# Patient Record
Sex: FEMALE | Race: WHITE | NOT HISPANIC OR LATINO | URBAN - METROPOLITAN AREA
[De-identification: names, ages, dates, MRNs, and addresses within clinical notes are randomized per-mention and may not be internally consistent; named-entity substitution may affect disease eponyms.]

---

## 2020-09-03 ENCOUNTER — AMBULATORY SURGICAL CENTER (OUTPATIENT)
Dept: URBAN - METROPOLITAN AREA SURGERY 20 | Facility: SURGERY | Age: 60
End: 2020-09-03
Payer: MEDICARE

## 2020-09-03 ENCOUNTER — OFFICE (OUTPATIENT)
Dept: URBAN - METROPOLITAN AREA PATHOLOGY 4 | Facility: PATHOLOGY | Age: 60
End: 2020-09-03
Payer: MEDICARE

## 2020-09-03 DIAGNOSIS — K63.89 OTHER SPECIFIED DISEASES OF INTESTINE: ICD-10-CM

## 2020-09-03 DIAGNOSIS — K50.90 CROHN'S DISEASE, UNSPECIFIED, WITHOUT COMPLICATIONS: ICD-10-CM

## 2020-09-03 DIAGNOSIS — K50.80 CROHN'S DISEASE OF BOTH SMALL AND LARGE INTESTINE WITHOUT CO: ICD-10-CM

## 2020-09-03 LAB
GI HISTOLOGY: A. SELECT: (no result)
GI HISTOLOGY: B. SELECT: (no result)
GI HISTOLOGY: C. SELECT: (no result)
GI HISTOLOGY: PDF REPORT: (no result)

## 2020-09-03 PROCEDURE — 45378 DIAGNOSTIC COLONOSCOPY: CPT | Mod: PT | Performed by: INTERNAL MEDICINE

## 2020-09-03 PROCEDURE — 45380 COLONOSCOPY AND BIOPSY: CPT | Mod: PT | Performed by: INTERNAL MEDICINE

## 2020-09-03 PROCEDURE — 88305 TISSUE EXAM BY PATHOLOGIST: CPT | Performed by: INTERNAL MEDICINE

## 2021-09-09 PROBLEM — K50.90 CROHN'S DISEASE, UNSPECIFIED, WITHOUT COMPLICATIONS: Status: ACTIVE | Noted: 2020-09-03

## 2023-06-30 ENCOUNTER — ON CAMPUS - OUTPATIENT (OUTPATIENT)
Dept: URBAN - METROPOLITAN AREA HOSPITAL 114 | Facility: HOSPITAL | Age: 63
End: 2023-06-30
Payer: MEDICARE

## 2023-06-30 DIAGNOSIS — K29.50 UNSPECIFIED CHRONIC GASTRITIS WITHOUT BLEEDING: ICD-10-CM

## 2023-06-30 DIAGNOSIS — K44.9 DIAPHRAGMATIC HERNIA WITHOUT OBSTRUCTION OR GANGRENE: ICD-10-CM

## 2023-06-30 DIAGNOSIS — K22.2 ESOPHAGEAL OBSTRUCTION: ICD-10-CM

## 2023-06-30 DIAGNOSIS — K21.01 GASTRO-ESOPHAGEAL REFLUX DISEASE WITH ESOPHAGITIS, WITH BLEE: ICD-10-CM

## 2023-06-30 DIAGNOSIS — R13.10 DYSPHAGIA, UNSPECIFIED: ICD-10-CM

## 2023-06-30 PROCEDURE — 43239 EGD BIOPSY SINGLE/MULTIPLE: CPT | Performed by: INTERNAL MEDICINE

## 2023-06-30 PROCEDURE — 43450 DILATE ESOPHAGUS 1/MULT PASS: CPT | Performed by: INTERNAL MEDICINE

## 2024-02-01 ENCOUNTER — TRANSCRIBE ORDERS (OUTPATIENT)
Dept: ADMINISTRATIVE | Facility: HOSPITAL | Age: 64
End: 2024-02-01
Payer: MEDICARE

## 2024-02-01 DIAGNOSIS — Z12.31 VISIT FOR SCREENING MAMMOGRAM: Primary | ICD-10-CM

## 2024-02-22 ENCOUNTER — OFFICE VISIT (OUTPATIENT)
Dept: INTERNAL MEDICINE | Facility: CLINIC | Age: 64
End: 2024-02-22
Payer: MEDICARE

## 2024-02-22 ENCOUNTER — HOSPITAL ENCOUNTER (OUTPATIENT)
Dept: MAMMOGRAPHY | Facility: HOSPITAL | Age: 64
Discharge: HOME | End: 2024-02-22
Admitting: FAMILY MEDICINE
Payer: MEDICARE

## 2024-02-22 VITALS
TEMPERATURE: 98 F | BODY MASS INDEX: 26.93 KG/M2 | OXYGEN SATURATION: 97 % | HEIGHT: 63 IN | DIASTOLIC BLOOD PRESSURE: 82 MMHG | HEART RATE: 92 BPM | SYSTOLIC BLOOD PRESSURE: 126 MMHG | WEIGHT: 152 LBS

## 2024-02-22 DIAGNOSIS — M54.9 UPPER BACK PAIN: ICD-10-CM

## 2024-02-22 DIAGNOSIS — N64.4 PAINFUL BREASTS: ICD-10-CM

## 2024-02-22 DIAGNOSIS — N62 LARGE BREASTS: ICD-10-CM

## 2024-02-22 DIAGNOSIS — M67.431 GANGLION CYST OF WRIST, RIGHT: Primary | ICD-10-CM

## 2024-02-22 DIAGNOSIS — Z12.31 VISIT FOR SCREENING MAMMOGRAM: ICD-10-CM

## 2024-02-22 PROBLEM — M67.40 GANGLION CYST: Status: ACTIVE | Noted: 2024-02-22

## 2024-02-22 PROCEDURE — 77063 BREAST TOMOSYNTHESIS BI: CPT

## 2024-02-22 PROCEDURE — 99213 OFFICE O/P EST LOW 20 MIN: CPT | Performed by: FAMILY MEDICINE

## 2024-02-22 PROCEDURE — 1160F RVW MEDS BY RX/DR IN RCRD: CPT | Performed by: FAMILY MEDICINE

## 2024-02-22 PROCEDURE — 77067 SCR MAMMO BI INCL CAD: CPT

## 2024-02-22 PROCEDURE — 1159F MED LIST DOCD IN RCRD: CPT | Performed by: FAMILY MEDICINE

## 2024-02-22 RX ORDER — CLOBETASOL PROPIONATE 0.5 MG/G
OINTMENT TOPICAL
COMMUNITY
Start: 2023-12-06

## 2024-02-22 NOTE — PROGRESS NOTES
Subjective   Salome HINOJOSA is a 63 y.o. female presenting today for follow up of   Chief Complaint   Patient presents with    Wrist Pain     Growth on right wrist, painful, has had for 2-3 months but notes that is got big quickly    Breast Pain     Bilateral breast pain, very tender. Would like to discuss reduction       History of Present Illness     Mass of right wrist.  She has a history of ganglion cyst removal of the wrist wrist.  Over the past 3 months she has noticed another mass of the volar wrist that is growing.  It is tender.  Large breasts.  Pt reports that are painful and cause upper back pain.  She is interested in breast reduction.    Patient Active Problem List   Diagnosis    History of breast lump    Myasthenia gravis    Persistent insomnia    History of colon polyps    Crohn's disease    Menopausal symptom    Bipolar disorder    Attention deficit hyperactivity disorder (ADHD), combined type    Hypovitaminosis D    Physical exam, annual    Injury of finger of right hand    Labral tear of right hip joint    Chronic right shoulder pain    Senile cataracts of both eyes    Eyebrow ptosis    Acquired blepharoptosis of both eyes    Osteopenia of hip    Benign microscopic hematuria    Primary osteoarthritis of right hip    Elevated MCV    Neck pain    Cervical spondylosis without myelopathy    Ganglion cyst       Current Outpatient Medications on File Prior to Visit   Medication Sig    amphetamine-dextroamphetamine XR (ADDERALL XR) 20 MG 24 hr capsule Take 1 capsule by mouth Every Morning HOLD PRIOR TO SURG 3 TO 5 DAYS    ARIPiprazole (ABILIFY) 5 MG tablet Take 1 tablet by mouth Daily.    azaTHIOprine (IMURAN) 50 MG tablet Take 2 tablets by mouth Daily.    clobetasol (TEMOVATE) 0.05 % ointment APPLY TO AFFECTED AREA TWICE DAILY FOR UP TO TWO WEEKS    cyclobenzaprine (FLEXERIL) 5 MG tablet TAKE 1 TABLET BY MOUTH THREE TIMES A DAY AS NEEDED FOR MUSCLE SPASM    diclofenac (VOLTAREN) 75 MG EC tablet TAKE 1  "TABLET BY MOUTH TWICE A DAY AS NEEDED FOR PAIN    estradiol (ESTRACE) 2 MG tablet Take 1 tablet by mouth Daily. (Patient taking differently: Take 0.5 tablets by mouth Daily.)    lamoTRIgine (LaMICtal) 200 MG tablet Take 1 tablet by mouth Every Morning.    lansoprazole (PREVACID) 30 MG capsule Take 1 capsule by mouth Daily.    MILK THISTLE PO Take  by mouth.    Mirabegron ER (MYRBETRIQ) 50 MG tablet sustained-release 24 hour 24 hr tablet Take 50 mg by mouth Daily.    zolpidem (AMBIEN) 10 MG tablet Take 1 tablet by mouth Every Night.     Current Facility-Administered Medications on File Prior to Visit   Medication    Chlorhexidine Gluconate Cloth 2 % pads          The following portions of the patient's history were reviewed and updated as appropriate: allergies, current medications, past family history, past medical history, past social history, past surgical history and problem list.    Review of Systems   Constitutional: Negative.    Respiratory: Negative.     Genitourinary:  Positive for breast pain.   Musculoskeletal:  Positive for arthralgias, back pain and neck pain.   Neurological:  Negative for numbness.       Objective   Vitals:    02/22/24 1451   BP: 126/82   BP Location: Left arm   Patient Position: Sitting   Cuff Size: Adult   Pulse: 92   Temp: 98 °F (36.7 °C)   TempSrc: Infrared   SpO2: 97%   Weight: 68.9 kg (152 lb)   Height: 160 cm (62.99\")       BP Readings from Last 3 Encounters:   02/22/24 126/82   08/08/23 136/78   07/31/23 138/76        Wt Readings from Last 3 Encounters:   02/22/24 68.9 kg (152 lb)   09/12/23 68.6 kg (151 lb 3.2 oz)   08/08/23 68.9 kg (152 lb)        Body mass index is 26.93 kg/m².  Nursing notes and vitals reviewed.    Physical Exam  Vitals and nursing note reviewed.   Constitutional:       General: She is not in acute distress.     Appearance: Normal appearance.   HENT:      Head: Normocephalic and atraumatic.      Mouth/Throat:      Mouth: Mucous membranes are moist.   Eyes: "      Extraocular Movements: Extraocular movements intact.      Conjunctiva/sclera: Conjunctivae normal.   Pulmonary:      Effort: Pulmonary effort is normal. No respiratory distress.   Musculoskeletal:      Left wrist: Normal.        Arms:       Cervical back: Neck supple. No rigidity.      Comments: 1 cm fluctuant mass volar wrist, right.   Skin:     General: Skin is warm and dry.   Neurological:      General: No focal deficit present.      Mental Status: She is alert and oriented to person, place, and time.   Psychiatric:         Mood and Affect: Mood normal.         Behavior: Behavior normal.         No results found for this or any previous visit (from the past 672 hour(s)).      Assessment & Plan   Diagnoses and all orders for this visit:    1. Ganglion cyst of wrist, right (Primary)  -     Ambulatory Referral to Hand Surgery    2. Large breasts  -     Ambulatory Referral to Plastic Surgery    3. Painful breasts  -     Ambulatory Referral to Plastic Surgery    4. Upper back pain  -     Ambulatory Referral to Plastic Surgery      Ganglion cyst.  Pt interested in removal.  Referred to hand surgery.  Large breasts causing discomfort.  Pt referred to plastic surgery.        Medications, including side effects, were discussed with the patient. Patient verbalized understanding.  The plan of care was discussed. All questions were answered. Patient verbalized understanding.      Return for Medicare Wellness.

## 2024-03-18 DIAGNOSIS — M54.12 CERVICAL RADICULAR PAIN: ICD-10-CM

## 2024-03-19 RX ORDER — CYCLOBENZAPRINE HCL 5 MG
5 TABLET ORAL 3 TIMES DAILY PRN
Qty: 90 TABLET | Refills: 1 | Status: SHIPPED | OUTPATIENT
Start: 2024-03-19

## 2024-03-19 NOTE — TELEPHONE ENCOUNTER
Rx Refill Note  Requested Prescriptions     Pending Prescriptions Disp Refills    cyclobenzaprine (FLEXERIL) 5 MG tablet [Pharmacy Med Name: CYCLOBENZAPRINE 5 MG TABLET] 90 tablet 1     Sig: TAKE 1 TABLET BY MOUTH THREE TIMES A DAY AS NEEDED FOR MUSCLE SPASM      Last office visit with prescribing clinician: 2/22/2024   Last telemedicine visit with prescribing clinician: Visit date not found   Next office visit with prescribing clinician: Visit date not found                         Would you like a call back once the refill request has been completed: [] Yes [] No    If the office needs to give you a call back, can they leave a voicemail: [] Yes [] No    Akilah Marcial, MARCY  03/19/24, 12:53 EDT

## 2024-03-27 ENCOUNTER — TELEPHONE (OUTPATIENT)
Dept: NEUROSURGERY | Facility: CLINIC | Age: 64
End: 2024-03-27
Payer: MEDICARE

## 2024-03-27 NOTE — TELEPHONE ENCOUNTER
Dr Dax Paredes's office, patients neurologists called for last office note. Faxed over only record we have to [phone]

## 2024-04-02 NOTE — TELEPHONE ENCOUNTER
Rx Refill Note  Requested Prescriptions     Pending Prescriptions Disp Refills    estradiol (ESTRACE) 2 MG tablet [Pharmacy Med Name: ESTRADIOL 2 MG TABLET] 90 tablet 3     Sig: TAKE 1 TABLET BY MOUTH EVERY DAY      Last office visit with prescribing clinician: 2/22/2024   Last telemedicine visit with prescribing clinician: Visit date not found   Next office visit with prescribing clinician: Visit date not found                         Would you like a call back once the refill request has been completed: [] Yes [] No    If the office needs to give you a call back, can they leave a voicemail: [] Yes [] No    Loraine Jim MA  04/02/24, 09:35 EDT

## 2024-04-03 RX ORDER — ESTRADIOL 2 MG/1
2 TABLET ORAL DAILY
Qty: 90 TABLET | Refills: 3 | Status: SHIPPED | OUTPATIENT
Start: 2024-04-03

## 2024-04-04 DIAGNOSIS — M54.12 CERVICAL RADICULAR PAIN: ICD-10-CM

## 2024-04-04 RX ORDER — DICLOFENAC SODIUM 75 MG/1
TABLET, DELAYED RELEASE ORAL
Qty: 60 TABLET | Refills: 1 | Status: SHIPPED | OUTPATIENT
Start: 2024-04-04

## 2024-05-29 DIAGNOSIS — M54.12 CERVICAL RADICULAR PAIN: ICD-10-CM

## 2024-05-29 RX ORDER — CYCLOBENZAPRINE HCL 5 MG
5 TABLET ORAL 3 TIMES DAILY PRN
Qty: 90 TABLET | Refills: 1 | Status: SHIPPED | OUTPATIENT
Start: 2024-05-29

## 2024-07-30 DIAGNOSIS — M54.12 CERVICAL RADICULAR PAIN: ICD-10-CM

## 2024-07-30 NOTE — TELEPHONE ENCOUNTER
Rx Refill Note  Requested Prescriptions     Pending Prescriptions Disp Refills    cyclobenzaprine (FLEXERIL) 5 MG tablet [Pharmacy Med Name: CYCLOBENZAPRINE 5 MG TABLET] 90 tablet 1     Sig: TAKE 1 TABLET BY MOUTH THREE TIMES A DAY AS NEEDED FOR MUSCLE SPASM      Last office visit with prescribing clinician: 2/22/2024   Last telemedicine visit with prescribing clinician: Visit date not found   Next office visit with prescribing clinician: Visit date not found                         Would you like a call back once the refill request has been completed: [] Yes [] No    If the office needs to give you a call back, can they leave a voicemail: [] Yes [] No    Katt Rodriguez MA  07/30/24, 19:08 EDT

## 2024-07-31 RX ORDER — CYCLOBENZAPRINE HCL 5 MG
5 TABLET ORAL 3 TIMES DAILY PRN
Qty: 90 TABLET | Refills: 1 | Status: SHIPPED | OUTPATIENT
Start: 2024-07-31

## 2024-09-19 DIAGNOSIS — M54.12 CERVICAL RADICULAR PAIN: ICD-10-CM

## 2024-09-19 RX ORDER — CYCLOBENZAPRINE HCL 5 MG
5 TABLET ORAL 3 TIMES DAILY PRN
Qty: 90 TABLET | Refills: 1 | Status: SHIPPED | OUTPATIENT
Start: 2024-09-19

## 2024-12-06 NOTE — TELEPHONE ENCOUNTER
Rx Refill Note  Requested Prescriptions     Pending Prescriptions Disp Refills    cyclobenzaprine (FLEXERIL) 5 MG tablet [Pharmacy Med Name: CYCLOBENZAPRINE 5 MG TABLET] 90 tablet 1     Sig: TAKE 1 TABLET BY MOUTH THREE TIMES A DAY AS NEEDED FOR MUSCLE SPASM      Last office visit with prescribing clinician: 2/22/2024   Last telemedicine visit with prescribing clinician: Visit date not found   Next office visit with prescribing clinician: Visit date not found                         Would you like a call back once the refill request has been completed: [] Yes [] No    If the office needs to give you a call back, can they leave a voicemail: [] Yes [] No    Akilah Marcial, MARCY  05/29/24, 14:06 EDT  
Name band;

## 2025-01-10 ENCOUNTER — APPOINTMENT (OUTPATIENT)
Dept: GENERAL RADIOLOGY | Facility: HOSPITAL | Age: 65
End: 2025-01-10
Payer: MEDICARE

## 2025-01-10 ENCOUNTER — HOSPITAL ENCOUNTER (EMERGENCY)
Facility: HOSPITAL | Age: 65
Discharge: HOME | End: 2025-01-10
Attending: EMERGENCY MEDICINE
Payer: MEDICARE

## 2025-01-10 VITALS
TEMPERATURE: 96.9 F | OXYGEN SATURATION: 98 % | SYSTOLIC BLOOD PRESSURE: 130 MMHG | HEART RATE: 89 BPM | RESPIRATION RATE: 20 BRPM | DIASTOLIC BLOOD PRESSURE: 80 MMHG

## 2025-01-10 DIAGNOSIS — S83.411A SPRAIN OF MEDIAL COLLATERAL LIGAMENT OF RIGHT KNEE, INITIAL ENCOUNTER: Primary | ICD-10-CM

## 2025-01-10 DIAGNOSIS — S80.01XA CONTUSION OF RIGHT KNEE, INITIAL ENCOUNTER: ICD-10-CM

## 2025-01-10 PROCEDURE — 73562 X-RAY EXAM OF KNEE 3: CPT

## 2025-01-10 PROCEDURE — 99283 EMERGENCY DEPT VISIT LOW MDM: CPT

## 2025-01-10 RX ORDER — ACETAMINOPHEN 500 MG
1000 TABLET ORAL ONCE
Status: COMPLETED | OUTPATIENT
Start: 2025-01-10 | End: 2025-01-10

## 2025-01-10 RX ADMIN — ACETAMINOPHEN 1000 MG: 500 TABLET, FILM COATED ORAL at 12:13

## 2025-01-10 NOTE — ED PROVIDER NOTES
MD ATTESTATION NOTE     SHARED VISIT: This visit was performed by BOTH a physician and an APC. The substantive portion of the medical decision making was performed by this attesting physician who made or approved the management plan and takes responsibility for patient management. All studies in the APC note (if performed) were independently interpreted by me.  The BREANNA and I have discussed this patient's history, physical exam, and treatment plan. I have reviewed the documentation and personally had a face to face interaction with the patient. I affirm the documentation and agree with the treatment and plan. I provided a substantive portion of the care of the patient.  I personally performed the physical exam in its entirety, and below are my findings.      Brief HPI: Patient presents to the ED with an acute right knee injury.  She slipped on ice last night.  Her right leg twisted underneath her.  She has been able to ambulate since then but pain is worse with ambulation.  Denies other injuries or numbness/tingling in her right leg.    PHYSICAL EXAM    GENERAL: Awake, alert, oriented x 3.  No acute distress  HENT: nares patent  EYES: no scleral icterus  CV: Normal right DP and PT pulses  RESPIRATORY: normal effort  ABDOMEN: soft  MUSCULOSKELETAL: There is tenderness over the medial aspect of the right knee.  There is bruising over the inferior medial aspect of the right knee.  Remainder of the right lower extremity is nontender.  There is full range of motion of the right knee.  NEURO: Normal strength and light touch sensation in the right lower extremity  PSYCH:  calm, cooperative  SKIN: warm, dry    Vital signs and nursing notes reviewed.        Plan: X-rays are negative acute.  She will be placed in a knee sleeve and given a walker.  She will be referred to orthopedics for follow-up.    Right knee x-rays personally interpreted by me.  My personal interpretation is: No fracture.  No dislocation.  No  effusion.    ED Course as of 01/10/25 1147   Fri Adrian 10, 2025   1105 Plain films show no acute fracture.  Patient is able to bear weight but this does exacerbate her symptoms.  Order neoprene knee brace, walker.  Will have the patient to follow-up closely with her orthopedist for further evaluation.  Blood return to the ED should she have any further concerns.  Patient is to avoid NSAIDs due to an upcoming breast surgery.  Will treat with Tylenol and RICE therapy. [RC]      ED Course User Index  [RC] Hang Betancourt III, Ross Mckeon MD  01/10/25 1147

## 2025-01-10 NOTE — ED PROVIDER NOTES
EMERGENCY DEPARTMENT ENCOUNTER    Room Number:  21/21  Date seen:  1/10/2025  Time seen: 11:00 EST  PCP: Mayo Lackey MD  Historian: Patient and patient's spouse    Discussed/obtained information from independent historians: Also some history    HPI:  Chief complaint: Right knee pain  A complete HPI/ROS/PMH/PSH/SH/FH are unobtainable due to: Nothing  Context:Salome HINOJOSA is a 64 y.o. female who presents to the ED with c/o  acute acute right knee pain.  Patient reports she was attempting to a send some icy steps.  Her foot slipped causing her to fall on the right leg injuring the right knee.  During the fall the leg was said to be flexed at the knee and displaced posterior laterally.  Patient denies any head.  No LOC.  She states she is able to bear weight but this does exacerbate her symptoms.  Pain does not radiate.  The pain is said to be primarily along the medial aspect of the right knee.  Patient is on anticoagulant or platelet therapy.  She is here for further evaluation.    External (non-ED) record review: Patient appears to be a patient of [facility].  I can see where she saw Dr. Weston Vann on 9/1/2022.  Patient had a total hip with arthroplasty performed by Dr. Vann on 9/14/2022    Chronic or social conditions impacting care:    ALLERGIES  Penicillins    PAST MEDICAL HISTORY  Active Ambulatory Problems     Diagnosis Date Noted    History of breast lump 03/02/2016    Myasthenia gravis 03/02/2016    Persistent insomnia 03/02/2016    History of colon polyps 03/02/2016    Crohn's disease 03/02/2016    Menopausal symptom 03/02/2016    Bipolar disorder     Attention deficit hyperactivity disorder (ADHD), combined type 03/02/2016    Hypovitaminosis D 09/14/2016    Physical exam, annual 10/10/2016    Injury of finger of right hand 03/15/2017    Labral tear of right hip joint 07/25/2019    Chronic right shoulder pain 07/25/2019    Senile cataracts of both eyes 12/10/2014    Eyebrow  ptosis 12/10/2014    Acquired blepharoptosis of both eyes 12/10/2014    Osteopenia of hip 08/07/2019    Benign microscopic hematuria 08/31/2020    Primary osteoarthritis of right hip 06/01/2022    Elevated MCV 07/01/2022    Neck pain 08/08/2023    Cervical spondylosis without myelopathy 08/08/2023    Ganglion cyst 02/22/2024     Resolved Ambulatory Problems     Diagnosis Date Noted    No Resolved Ambulatory Problems     Past Medical History:   Diagnosis Date    ADHD (attention deficit hyperactivity disorder)     Allergic     Arthritis     Bipolar 2 disorder     Chronic diarrhea     Clinical diagnosis of COVID-19 07/06/2022    Crohn disease     Depression     Deviated septum     OAB (overactive bladder)     Right hip pain     Skin cancer     Stool incontinence        PAST SURGICAL HISTORY  Past Surgical History:   Procedure Laterality Date    BREAST EXCISIONAL BIOPSY Right 2002    benign    BREAST LUMPECTOMY Right 2002    COLONOSCOPY      ENDOSCOPY N/A 06/30/2023    Procedure: ESOPHAGOGASTRODUODENOSCOPY WITH DILATION with 52 and 54 akbar and cold bxs;  Surgeon: Rigoberto Page MD;  Location: [facility] ENDOSCOPY;  Service: Gastroenterology;  Laterality: N/A;  PRE: Dysphagia  POST: Esophageal Ring, Esophagitis, Gastritis    EYE SURGERY  2014    Lid lift left eye    FINGER SURGERY Left     INDEX TIP TRAUMA    FRONTALIS SUSPENSION Left     HYSTERECTOMY      JOINT REPLACEMENT  9/16/2022    NASAL SEPTUM SURGERY      TOTAL HIP ARTHROPLASTY Right 09/14/2022    Procedure: TOTAL HIP ARTHROPLASTY ANTERIOR WITH HANA TABLE;  Surgeon: Weston Vann MD;  [place]  [facility] [place] [facility] [place]  Service: [place]opedics;  Laterality: Right;       FAMILY HISTORY  Family History   Problem Relation Age of Onset    Hypertension Mother     Arthritis Mother     Diabetes Father     Heart disease Father     Hypertension Father     Stroke Father     Cancer Maternal Grandmother         colon    Stroke Maternal Grandfather     Breast cancer Neg Hx     Malig  Hyperthermia Neg Hx        SOCIAL HISTORY  Social History     Socioeconomic History    Marital status:    Tobacco Use    Smoking status: Former     Current packs/day: 0.00     Average packs/day: 1 pack/day for 5.0 years (5.0 ttl pk-yrs)     Types: Cigarettes     Start date: 2002     Quit date: 2007     Years since quittin.0    Smokeless tobacco: Never   Vaping Use    Vaping status: Never Used   Substance and Sexual Activity    Alcohol use: Yes     Alcohol/week: 7.0 standard drinks of alcohol     Types: 7 Drinks containing 0.5 oz of alcohol per week     Comment: 1 DRINK DAY    Drug use: No    Sexual activity: Not Currently     Partners: Male     Birth control/protection: Hysterectomy       REVIEW OF SYSTEMS  Review of Systems    All systems reviewed and negative except for those discussed in HPI.     PHYSICAL EXAM    I have reviewed the triage vital signs and nursing notes.  Vitals:    01/10/25 1102   BP: 130/80   Pulse:    Resp:    Temp:    SpO2:      Physical Exam    GENERAL: DW female, not distressed  HENT: nares patent  EYES: no scleral icterus  NECK: no ROM limitations  CV: regular rhythm, regular rate S1-S2  RESPIRATORY: normal effort, lungs CTAB  ABDOMEN: soft  : deferred  MUSCULOSKELETAL: Right knee: TTP along the medial aspect of the knee vicinity MCL.  No laxity on exam.  There is some mild soft tissue swelling and ecchymosis.  The remainder of the right lower extremity to include the hip is unremarkable.  NEURO: alert, moves all extremities, follows commands  SKIN: warm, dry    LAB RESULTS  No results found for this or any previous visit (from the past 24 hours).    Ordered the above labs and independently interpreted results.  My findings will be discussed in the ED course or medical decision making section below    RADIOLOGY RESULTS  XR Knee 3 View Right    Result Date: 1/10/2025  XR KNEE 3 VW RIGHT-  STANDARD 2 VIEWS RIGHT KNEE  CLINICAL INFORMATION: Fell, pain  FINDINGS: Joint  width is preserved. No articular abnormality. No joint effusion, surrounding soft tissues within normal limits. No fracture nor dislocation. No bone lesion.  CONCLUSION: Normal right knee.  This report was finalized on 1/10/2025 10:32 AM by Dr. Earl Galvan M.D on Workstation: [employer]        Ordered the above noted radiological studies.  Independently interpreted by me.  My findings will be discussed in the medical decision section below.     PROGRESS, DATA ANALYSIS, CONSULTS AND MEDICAL DECISION MAKING    Please note that this section constitutes my independent interpretation of clinical data including lab results, radiology, EKG's.  This constitutes my independent professional opinion regarding differential diagnosis and management of this patient.  It may include any factors such as history from outside sources, review of external records, social determinants of health, management of medications, response to those treatments, and discussions with other providers.    ED Course as of 01/10/25 1935   Fri Adrian 10, 2025   1105 Plain films show no acute fracture.  Patient is able to bear weight but this does exacerbate her symptoms.  Order neoprene knee brace, walker.  Will have the patient to follow-up closely with her orthopedist for further evaluation.  Blood return to the ED should she have any further concerns.  Patient is to avoid NSAIDs due to an upcoming breast surgery.  Will treat with Tylenol and RICE therapy. [RC]      ED Course User Index  [RC] Hang Betancourt III, PA     Orders placed during this visit:  Orders Placed This Encounter   Procedures    [facility] 04.  Hinged Knee Brace, 12.  Standard Walker Folding; Prevents Accomplishing MRADLs; Able to Safely Use Equipment; Mobility Deficit Can Be Sufficiently Resolved By Use of Equipment    XR Knee 3 View Right            Medical Decision Making    DDx: Fracture, avulsion fracture, MCL strain, medial meniscal injury, other soft  tissue derangement of the right knee.  Course for MDM process.      DIAGNOSIS  Final diagnoses:   Sprain of medial collateral ligament of right knee, initial encounter   Contusion of right knee, initial encounter          Medication List      No changes were made to your prescriptions during this visit.         FOLLOW-UP  [facility]  [address]  [facility] [address] [phone]  Schedule an appointment as soon as possible for a visit   For further evaluation and treatment        Latest Documented Vital Signs:  As of 19:35 EST  BP- 130/80 HR- 89 Temp- 96.9 °F (36.1 °C) (Tympanic) O2 sat- 98%    Appropriate PPE utilized throughout this patient encounter to include mask, if indicated, per current protocol. Hand hygiene was performed before donning PPE and after removal when leaving the room.    Please note that portions of this were completed with a voice recognition program.     Note Disclaimer: At [facility], we believe that sharing information builds trust and better relationships. You are receiving this note because you are receiving care at [facility] or recently visited. It is possible you will see health information before a provider has talked with you about it. This kind of information can be easy to misunderstand. To help you fully understand what it means for your health, we urge you to discuss this note with your provider.                Hang Betancourt III, PA  01/10/25 1935

## 2025-01-10 NOTE — DISCHARGE INSTRUCTIONS
Wear the knee brace for the next week as needed.  Use a walker as needed for extra balance and to help prevent another fall secondary to a painful weak right lower extremity.  Recommend Tylenol and ice for pain.  Follow-up with your orthopedist as scheduled.  Return to the ER with any worsening symptoms or should you have any further concerns.

## 2025-01-25 DIAGNOSIS — M54.12 CERVICAL RADICULAR PAIN: ICD-10-CM

## 2025-01-27 DIAGNOSIS — Z86.0100 HISTORY OF COLON POLYPS: ICD-10-CM

## 2025-01-27 DIAGNOSIS — Z13.29 SCREENING FOR THYROID DISORDER: ICD-10-CM

## 2025-01-27 DIAGNOSIS — Z13.220 SCREENING FOR HYPERLIPIDEMIA: ICD-10-CM

## 2025-01-27 DIAGNOSIS — Z13.1 SCREENING FOR DIABETES MELLITUS: ICD-10-CM

## 2025-01-27 DIAGNOSIS — Z00.00 ROUTINE HEALTH MAINTENANCE: ICD-10-CM

## 2025-01-27 DIAGNOSIS — K50.919 CROHN'S DISEASE WITH COMPLICATION, UNSPECIFIED GASTROINTESTINAL TRACT LOCATION: ICD-10-CM

## 2025-01-27 DIAGNOSIS — E55.9 HYPOVITAMINOSIS D: Primary | ICD-10-CM

## 2025-01-27 RX ORDER — CYCLOBENZAPRINE HCL 5 MG
5 TABLET ORAL 3 TIMES DAILY PRN
Qty: 90 TABLET | Refills: 0 | Status: SHIPPED | OUTPATIENT
Start: 2025-01-27

## 2025-01-27 NOTE — TELEPHONE ENCOUNTER
Message sent to pt that appt is needed.   Rx Refill Note  Requested Prescriptions     Pending Prescriptions Disp Refills    cyclobenzaprine (FLEXERIL) 5 MG tablet [Pharmacy Med Name: CYCLOBENZAPRINE 5 MG TABLET] 90 tablet 0     Sig: TAKE 1 TABLET BY MOUTH THREE TIMES A DAY AS NEEDED FOR MUSCLE SPASM      Last office visit with prescribing clinician: 2/22/2024   Last telemedicine visit with prescribing clinician: Visit date not found   Next office visit with prescribing clinician: Visit date not found                         Would you like a call back once the refill request has been completed: [] Yes [] No    If the office needs to give you a call back, can they leave a voicemail: [] Yes [] No    Adrianne Lira MA  01/27/25, 10:16 EST

## 2025-01-28 DIAGNOSIS — E55.9 HYPOVITAMINOSIS D: ICD-10-CM

## 2025-01-28 DIAGNOSIS — Z86.0100 HISTORY OF COLON POLYPS: ICD-10-CM

## 2025-01-28 DIAGNOSIS — Z00.00 ROUTINE HEALTH MAINTENANCE: ICD-10-CM

## 2025-01-28 DIAGNOSIS — Z13.220 SCREENING FOR HYPERLIPIDEMIA: ICD-10-CM

## 2025-01-28 DIAGNOSIS — Z13.1 SCREENING FOR DIABETES MELLITUS: ICD-10-CM

## 2025-01-28 DIAGNOSIS — Z13.29 SCREENING FOR THYROID DISORDER: ICD-10-CM

## 2025-01-28 DIAGNOSIS — K50.919 CROHN'S DISEASE WITH COMPLICATION, UNSPECIFIED GASTROINTESTINAL TRACT LOCATION: ICD-10-CM

## 2025-01-30 LAB
25(OH)D3+25(OH)D2 SERPL-MCNC: 44.2 NG/ML (ref 30–100)
ALBUMIN SERPL-MCNC: 4.3 G/DL (ref 3.9–4.9)
ALP SERPL-CCNC: 63 IU/L (ref 44–121)
ALT SERPL-CCNC: 11 IU/L (ref 0–32)
AST SERPL-CCNC: 20 IU/L (ref 0–40)
BILIRUB SERPL-MCNC: 0.2 MG/DL (ref 0–1.2)
BUN SERPL-MCNC: 13 MG/DL (ref 8–27)
BUN/CREAT SERPL: 21 (ref 12–28)
CALCIUM SERPL-MCNC: 9.3 MG/DL (ref 8.7–10.3)
CHLORIDE SERPL-SCNC: 100 MMOL/L (ref 96–106)
CHOLEST SERPL-MCNC: 175 MG/DL (ref 100–199)
CHOLEST/HDLC SERPL: 2.6 RATIO (ref 0–4.4)
CO2 SERPL-SCNC: 26 MMOL/L (ref 20–29)
CREAT SERPL-MCNC: 0.63 MG/DL (ref 0.57–1)
EGFRCR SERPLBLD CKD-EPI 2021: 98 ML/MIN/1.73
ERYTHROCYTE [DISTWIDTH] IN BLOOD BY AUTOMATED COUNT: 14.2 % (ref 11.7–15.4)
GLOBULIN SER CALC-MCNC: 2.2 G/DL (ref 1.5–4.5)
GLUCOSE SERPL-MCNC: 93 MG/DL (ref 70–99)
HBA1C MFR BLD: 5.5 % (ref 4.8–5.6)
HCT VFR BLD AUTO: 37.8 % (ref 34–46.6)
HDLC SERPL-MCNC: 67 MG/DL
HGB BLD-MCNC: 12.3 G/DL (ref 11.1–15.9)
LDLC SERPL CALC-MCNC: 71 MG/DL (ref 0–99)
MCH RBC QN AUTO: 33.2 PG (ref 26.6–33)
MCHC RBC AUTO-ENTMCNC: 32.5 G/DL (ref 31.5–35.7)
MCV RBC AUTO: 102 FL (ref 79–97)
PLATELET # BLD AUTO: 353 X10E3/UL (ref 150–450)
POTASSIUM SERPL-SCNC: 4.5 MMOL/L (ref 3.5–5.2)
PROT SERPL-MCNC: 6.5 G/DL (ref 6–8.5)
RBC # BLD AUTO: 3.71 X10E6/UL (ref 3.77–5.28)
SODIUM SERPL-SCNC: 139 MMOL/L (ref 134–144)
TRIGL SERPL-MCNC: 229 MG/DL (ref 0–149)
TSH SERPL DL<=0.005 MIU/L-ACNC: 2.33 UIU/ML (ref 0.45–4.5)
VIT B12 SERPL-MCNC: 592 PG/ML (ref 232–1245)
VLDLC SERPL CALC-MCNC: 37 MG/DL (ref 5–40)
WBC # BLD AUTO: 5.6 X10E3/UL (ref 3.4–10.8)

## 2025-03-01 DIAGNOSIS — M54.12 CERVICAL RADICULAR PAIN: ICD-10-CM

## 2025-03-03 RX ORDER — CYCLOBENZAPRINE HCL 5 MG
5 TABLET ORAL 3 TIMES DAILY PRN
Qty: 90 TABLET | Refills: 0 | Status: SHIPPED | OUTPATIENT
Start: 2025-03-03

## 2025-03-03 NOTE — TELEPHONE ENCOUNTER
Has appt in April    Rx Refill Note  Requested Prescriptions     Pending Prescriptions Disp Refills    cyclobenzaprine (FLEXERIL) 5 MG tablet [Pharmacy Med Name: CYCLOBENZAPRINE 5 MG TABLET] 90 tablet 0     Sig: TAKE 1 TABLET BY MOUTH THREE TIMES A DAY AS NEEDED FOR MUSCLE SPASM      Last office visit with prescribing clinician: 2/22/2024   Last telemedicine visit with prescribing clinician: Visit date not found   Next office visit with prescribing clinician: 4/23/2025                         Would you like a call back once the refill request has been completed: [] Yes [] No    If the office needs to give you a call back, can they leave a voicemail: [] Yes [] No    Adrianne Lira MA  03/03/25, 08:09 EST

## 2025-04-02 DIAGNOSIS — M54.12 CERVICAL RADICULAR PAIN: ICD-10-CM

## 2025-04-02 RX ORDER — CYCLOBENZAPRINE HCL 5 MG
5 TABLET ORAL 3 TIMES DAILY PRN
Qty: 90 TABLET | Refills: 0 | Status: SHIPPED | OUTPATIENT
Start: 2025-04-02

## 2025-04-02 NOTE — TELEPHONE ENCOUNTER
Rx Refill Note  Requested Prescriptions     Pending Prescriptions Disp Refills    cyclobenzaprine (FLEXERIL) 5 MG tablet [Pharmacy Med Name: CYCLOBENZAPRINE 5 MG TABLET] 90 tablet 0     Sig: TAKE 1 TABLET BY MOUTH THREE TIMES A DAY AS NEEDED FOR MUSCLE SPASM      Last office visit with prescribing clinician: 2/22/2024   Last telemedicine visit with prescribing clinician: Visit date not found   Next office visit with prescribing clinician: 4/23/2025                         Would you like a call back once the refill request has been completed: [] Yes [] No    If the office needs to give you a call back, can they leave a voicemail: [] Yes [] No    Adrianne Lira MA  04/02/25, 09:00 EDT

## 2025-04-23 ENCOUNTER — OFFICE VISIT (OUTPATIENT)
Dept: INTERNAL MEDICINE | Facility: CLINIC | Age: 65
End: 2025-04-23
Payer: MEDICARE

## 2025-04-23 VITALS
HEIGHT: 63 IN | HEART RATE: 88 BPM | WEIGHT: 145 LBS | SYSTOLIC BLOOD PRESSURE: 150 MMHG | BODY MASS INDEX: 25.69 KG/M2 | OXYGEN SATURATION: 96 % | DIASTOLIC BLOOD PRESSURE: 90 MMHG

## 2025-04-23 DIAGNOSIS — E55.9 HYPOVITAMINOSIS D: ICD-10-CM

## 2025-04-23 DIAGNOSIS — M85.859 OSTEOPENIA OF HIP, UNSPECIFIED LATERALITY: ICD-10-CM

## 2025-04-23 DIAGNOSIS — N95.1 MENOPAUSAL SYMPTOM: ICD-10-CM

## 2025-04-23 DIAGNOSIS — G70.00 MYASTHENIA GRAVIS: ICD-10-CM

## 2025-04-23 DIAGNOSIS — M54.2 CHRONIC NECK PAIN: ICD-10-CM

## 2025-04-23 DIAGNOSIS — G89.29 CHRONIC NECK PAIN: ICD-10-CM

## 2025-04-23 DIAGNOSIS — K50.919 CROHN'S DISEASE WITH COMPLICATION, UNSPECIFIED GASTROINTESTINAL TRACT LOCATION: ICD-10-CM

## 2025-04-23 DIAGNOSIS — R03.0 ELEVATED BLOOD PRESSURE READING: ICD-10-CM

## 2025-04-23 DIAGNOSIS — G47.00 PERSISTENT INSOMNIA: ICD-10-CM

## 2025-04-23 DIAGNOSIS — M16.11 PRIMARY OSTEOARTHRITIS OF RIGHT HIP: ICD-10-CM

## 2025-04-23 DIAGNOSIS — G24.9 DYSTONIA: ICD-10-CM

## 2025-04-23 DIAGNOSIS — F90.2 ATTENTION DEFICIT HYPERACTIVITY DISORDER (ADHD), COMBINED TYPE: ICD-10-CM

## 2025-04-23 DIAGNOSIS — F31.70 BIPOLAR AFFECTIVE DISORDER IN REMISSION: ICD-10-CM

## 2025-04-23 DIAGNOSIS — Z78.0 POSTMENOPAUSAL: ICD-10-CM

## 2025-04-23 DIAGNOSIS — Z12.31 ENCOUNTER FOR SCREENING MAMMOGRAM FOR MALIGNANT NEOPLASM OF BREAST: ICD-10-CM

## 2025-04-23 DIAGNOSIS — Z00.00 MEDICARE ANNUAL WELLNESS VISIT, SUBSEQUENT: Primary | ICD-10-CM

## 2025-04-23 RX ORDER — CELECOXIB 200 MG/1
200 CAPSULE ORAL DAILY
Qty: 30 CAPSULE | Refills: 5 | Status: SHIPPED | OUTPATIENT
Start: 2025-04-23

## 2025-04-23 RX ORDER — CHOLECALCIFEROL (VITAMIN D3) 25 MCG
1000 TABLET ORAL DAILY
COMMUNITY

## 2025-04-23 RX ORDER — CLONAZEPAM 1 MG/1
1 TABLET ORAL 2 TIMES DAILY PRN
COMMUNITY
Start: 2024-11-26

## 2025-04-23 NOTE — PROGRESS NOTES
Subjective   The ABCs of the Annual Wellness Visit  Medicare Wellness Visit      Salome HINOJOSA is a 64 y.o. patient who presents for a Medicare Wellness Visit.    The following portions of the patient's history were reviewed and   updated as appropriate: allergies, current medications, past family history, past medical history, past social history, past surgical history, and problem list.    Compared to one year ago, the patient's physical   health is the same.  Compared to one year ago, the patient's mental   health is the same.    Recent Hospitalizations:  She was not admitted to the hospital during the last year.     Current Medical Providers:  Patient Care Team:  Mayo Lackey MD as PCP - General  Rigoberto Page MD as Consulting Physician (Gastroenterology)  Dax Paredes DO as Consulting Physician (Neurology)  Turner Bolden MD as Consulting Physician (Psychiatry)  Harsha Sal MD as Consulting Physician (Dermatology)    Outpatient Medications Prior to Visit   Medication Sig Dispense Refill    amphetamine-dextroamphetamine XR (ADDERALL XR) 20 MG 24 hr capsule Take 1 capsule by mouth Every Morning HOLD PRIOR TO SURG 3 TO 5 DAYS      azaTHIOprine (IMURAN) 50 MG tablet Take 2 tablets by mouth Daily.      cholecalciferol (Vitamin D, Cholecalciferol,) 25 MCG (1000 UT) tablet Take 1 tablet by mouth Daily.      clobetasol (TEMOVATE) 0.05 % ointment APPLY TO AFFECTED AREA TWICE DAILY FOR UP TO TWO WEEKS      clonazePAM (KlonoPIN) 1 MG tablet Take 1 tablet by mouth 2 (Two) Times a Day As Needed.      cyclobenzaprine (FLEXERIL) 5 MG tablet TAKE 1 TABLET BY MOUTH THREE TIMES A DAY AS NEEDED FOR MUSCLE SPASM 90 tablet 0    estradiol (ESTRACE) 2 MG tablet TAKE 1 TABLET BY MOUTH EVERY DAY (Patient taking differently: Take 0.5 tablets by mouth Daily.) 90 tablet 3    lamoTRIgine (LaMICtal) 200 MG tablet Take 1 tablet by mouth Every Morning.      lansoprazole (PREVACID) 30 MG capsule Take 1  capsule by mouth Daily.      MILK THISTLE PO Take  by mouth.      Mirabegron ER (MYRBETRIQ) 50 MG tablet sustained-release 24 hour 24 hr tablet Take 50 mg by mouth Daily.      zolpidem (AMBIEN) 10 MG tablet Take 1 tablet by mouth Every Night.      diclofenac (VOLTAREN) 75 MG EC tablet TAKE 1 TABLET BY MOUTH TWICE A DAY AS NEEDED FOR PAIN 60 tablet 1    ARIPiprazole (ABILIFY) 5 MG tablet Take 1 tablet by mouth Daily. (Patient not taking: Reported on 4/23/2025)       Facility-Administered Medications Prior to Visit   Medication Dose Route Frequency Provider Last Rate Last Admin    Chlorhexidine Gluconate Cloth 2 % pads   Apply externally BID Angela Crooks L, APRN         No opioid medication identified on active medication list. I have reviewed chart for other potential  high risk medication/s and harmful drug interactions in the elderly.      Aspirin is not on active medication list.  Aspirin use is not indicated based on review of current medical condition/s. Risk of harm outweighs potential benefits.  .    Patient Active Problem List   Diagnosis    History of breast lump    Myasthenia gravis    Persistent insomnia    History of colon polyps    Crohn's disease    Menopausal symptom    Bipolar disorder    Attention deficit hyperactivity disorder (ADHD), combined type    Hypovitaminosis D    Physical exam, annual    Injury of finger of right hand    Labral tear of right hip joint    Chronic right shoulder pain    Senile cataracts of both eyes    Eyebrow ptosis    Acquired blepharoptosis of both eyes    Osteopenia of hip    Benign microscopic hematuria    Primary osteoarthritis of right hip    Elevated MCV    Neck pain    Cervical spondylosis without myelopathy    Ganglion cyst    Dystonia     Advance Care Planning Advance Directive is not on file.  ACP discussion was held with the patient during this visit. Patient does not have an advance directive, declines further assistance.            Objective   Vitals:     "25 1109   BP: 150/90   Pulse: 88   SpO2: 96%   Weight: 65.8 kg (145 lb)   Height: 160 cm (62.99\")   PainSc: 7    PainLoc: Neck  Comment: right side- neck, hip, knee       Estimated body mass index is 25.69 kg/m² as calculated from the following:    Height as of this encounter: 160 cm (62.99\").    Weight as of this encounter: 65.8 kg (145 lb).    BMI is >= 25 and <30. (Overweight) The following options were offered after discussion;: exercise counseling/recommendations and nutrition counseling/recommendations           Does the patient have evidence of cognitive impairment? No  Lab Results   Component Value Date    CHLPL 175 2025    TRIG 229 (H) 2025    HDL 67 2025    LDL 71 2025    VLDL 37 2025    HGBA1C 5.5 2025                                                                                                Health  Risk Assessment    Smoking Status:  Social History     Tobacco Use   Smoking Status Former    Current packs/day: 0.00    Average packs/day: 1 pack/day for 5.0 years (5.0 ttl pk-yrs)    Types: Cigarettes    Start date: 2002    Quit date: 2007    Years since quittin.3   Smokeless Tobacco Never     Alcohol Consumption:  Social History     Substance and Sexual Activity   Alcohol Use Yes    Alcohol/week: 7.0 standard drinks of alcohol    Types: 7 Drinks containing 0.5 oz of alcohol per week    Comment: 1 DRINK DAY       Fall Risk Screen  STEADI Fall Risk Assessment was completed, and patient is at HIGH risk for falls. Assessment completed on:2025    Depression Screening   Little interest or pleasure in doing things? Nearly every day   Feeling down, depressed, or hopeless? Nearly every day   PHQ-2 Total Score 6   Trouble falling or staying asleep, or sleeping too much? More than half the days   Feeling tired or having little energy? More than half the days   Poor appetite or overeating? Not at all   Feeling bad about yourself - or that you are a failure " or have let yourself or your family down? Nearly every day   Trouble concentrating on things, such as reading the newspaper or watching television? Nearly every day   Moving or speaking so slowly that other people could have noticed? Or the opposite - being so fidgety or restless that you have been moving around a lot more than usual? Nearly every day     Thoughts that you would be better off dead, or of hurting yourself in some way? More than half the days   PHQ-9 Total Score 21   If you checked off any problems, how difficult have these problems made it for you to do your work, take care of things at home, or get along with other people? Somewhat difficult        Health Habits and Functional and Cognitive Screenin/19/2025     6:48 PM   Functional & Cognitive Status   Do you have difficulty preparing food and eating? No   Do you have difficulty bathing yourself, getting dressed or grooming yourself? No   Do you have difficulty using the toilet? No   Do you have difficulty moving around from place to place? No   Do you have trouble with steps or getting out of a bed or a chair? No   Current Diet Well Balanced Diet   Dental Exam Up to date   Eye Exam Up to date   Exercise (times per week) 2 times per week   Current Exercises Include Walking;Weightlifting   Do you need help using the phone?  No   Are you deaf or do you have serious difficulty hearing?  No   Do you need help to go to places out of walking distance? No   Do you need help shopping? No   Do you need help preparing meals?  No   Do you need help with housework?  Yes   Do you need help with laundry? No   Do you need help taking your medications? No   Do you need help managing money? No   Do you ever drive or ride in a car without wearing a seat belt? No   Have you felt unusual stress, anger or loneliness in the last month? Yes   Who do you live with? Spouse   If you need help, do you have trouble finding someone available to you? No   Have you been  bothered in the last four weeks by sexual problems? No   Do you have difficulty concentrating, remembering or making decisions? Yes           Age-appropriate Screening Schedule:  Refer to the list below for future screening recommendations based on patient's age, sex and/or medical conditions. Orders for these recommended tests are listed in the plan section. The patient has been provided with a written plan.    Health Maintenance List  Health Maintenance   Topic Date Due    DXA SCAN  11/10/2023    COVID-19 Vaccine (6 - 2024-25 season) 09/01/2024    INFLUENZA VACCINE  07/01/2025    MAMMOGRAM  02/22/2026    ANNUAL WELLNESS VISIT  04/23/2026    Pneumococcal Vaccine 50+ (3 of 3 - PPSV23, PCV20 or PCV21) 10/25/2026    TDAP/TD VACCINES (2 - Td or Tdap) 07/25/2029    COLORECTAL CANCER SCREENING  09/03/2030    HEPATITIS C SCREENING  Completed    ZOSTER VACCINE  Completed                                                                                                                                                CMS Preventative Services Quick Reference  Risk Factors Identified During Encounter  Immunizations Discussed/Encouraged: Prevnar 20 (Pneumococcal 20-valent conjugate) next time.    The above risks/problems have been discussed with the patient.  Pertinent information has been shared with the patient in the After Visit Summary.  An After Visit Summary and PPPS were made available to the patient.    Follow Up:   Next Medicare Wellness visit to be scheduled in 1 year.         Additional E&M Note during same encounter follows:  Patient has additional, significant, and separately identifiable condition(s)/problem(s) that require work above and beyond the Medicare Wellness Visit     Chief Complaint  Medicare Wellness-subsequent (Last 2/27/23) and Pain (Neck, hip,knee)    Subjective   HPI  Salome is also being seen today for additional medical problem/s.    Review of Systems   Constitutional: Negative.    HENT: Negative.    "  Musculoskeletal:  Positive for neck pain.   Psychiatric/Behavioral:  Positive for decreased concentration and dysphoric mood. Negative for self-injury and suicidal ideas.               Objective   Vital Signs:  /90   Pulse 88   Ht 160 cm (62.99\")   Wt 65.8 kg (145 lb)   SpO2 96%   BMI 25.69 kg/m²   Physical Exam  Vitals and nursing note reviewed.   Constitutional:       Appearance: Normal appearance. She is well-developed.   HENT:      Head: Normocephalic and atraumatic.      Right Ear: Tympanic membrane and external ear normal.      Left Ear: Tympanic membrane and external ear normal.      Nose: Nose normal.      Mouth/Throat:      Mouth: Mucous membranes are moist.      Pharynx: Oropharynx is clear.   Eyes:      General: No scleral icterus.        Right eye: No discharge.         Left eye: No discharge.      Conjunctiva/sclera: Conjunctivae normal.      Pupils: Pupils are equal, round, and reactive to light.   Neck:      Thyroid: No thyromegaly.      Vascular: No carotid bruit.   Cardiovascular:      Rate and Rhythm: Normal rate and regular rhythm.      Heart sounds: Normal heart sounds. No murmur heard.     No friction rub. No gallop.   Pulmonary:      Effort: Pulmonary effort is normal. No respiratory distress.      Breath sounds: Normal breath sounds. No wheezing or rales.   Abdominal:      General: Bowel sounds are normal.      Palpations: Abdomen is soft. There is no mass.      Tenderness: There is no abdominal tenderness.      Hernia: No hernia is present.   Musculoskeletal:         General: No deformity.      Cervical back: Neck supple. No rigidity.      Right lower leg: No edema.   Lymphadenopathy:      Cervical: No cervical adenopathy.   Skin:     General: Skin is warm and dry.      Findings: No rash.   Neurological:      Mental Status: She is alert and oriented to person, place, and time.      Cranial Nerves: No cranial nerve deficit.      Motor: No abnormal muscle tone.      Coordination: " Coordination normal.      Deep Tendon Reflexes: Reflexes are normal and symmetric. Reflexes normal.   Psychiatric:         Mood and Affect: Mood normal.         Behavior: Behavior normal.         Thought Content: Thought content normal.         Judgment: Judgment normal.                    Assessment and Plan      Medicare annual wellness visit, subsequent         Myasthenia gravis         Crohn's disease with complication, unspecified gastrointestinal tract location         Hypovitaminosis D         Bipolar affective disorder in remission           Menopausal symptom         Persistent insomnia         Attention deficit hyperactivity disorder (ADHD), combined type           Primary osteoarthritis of right hip         Dystonia       1. Myasthenia gravis.  She was under the care of Dr. Paredes.  She is off pyridostigmine, which wasn't effective.     2. Crohns.  Doing okay.  She sees Dr. Page and is on azathioprine.     3. Hypovitaminosis D.  Continue supplement.     4. Bipolar.  She is feeling more depressed, which she attributes to stopping Abilify and chronic neck pain.  Under the care of Dr. Bolden, whom she will call.  Denies SI.     5. Menopausal symptom.  Controlled with Estrace.  Mammogram due; will wait until late July, 6 months after breast reduction surgery.     6. Insomnia.  Controlled with zolpidem per Dr. Bolden.     7. ADHD.  Adequately controlled on Adderall, per Dr. Bolden.     8. Osteopenia.  Dexa scan last done 11/2021.  She was taken off of calcium by GI. Continue vitamin D, weight-bearing exercise.  Recheck Dexa scan with mammogram in July.    9. Dystonia, chronic neck pain.  On clonazepam per Dr. Manuel.  She says ibuprofen is more effective than diclofenac.  She is interested in trying celecoxib.    10. Routine health maint.  UTD on vaccines.  Prevnar 20 in 2026.  Colonoscopy UTD 2020.  Doesn't need pap smear.  Mammogram due in July 2025 (6 months post breast surgery).    11. Elevated blood pressure  reading.  Monitor home blood pressures and message me with numbers in 2-3 weeks.     Encounter for screening mammogram for malignant neoplasm of breast    Orders:    Mammo screening digital tomosynthesis bilateral w CAD; Future    Osteopenia of hip, unspecified laterality    Orders:    DEXA Bone Density Axial; Future    Postmenopausal    Orders:    DEXA Bone Density Axial; Future    Elevated blood pressure reading         Chronic neck pain    Orders:    celecoxib (CeleBREX) 200 MG capsule; Take 1 capsule by mouth Daily.            Follow Up   No follow-ups on file.  Patient was given instructions and counseling regarding her condition or for health maintenance advice. Please see specific information pulled into the AVS if appropriate.

## 2025-04-23 NOTE — ASSESSMENT & PLAN NOTE
1. Myasthenia gravis.  She was under the care of Dr. Paredes.  She is off pyridostigmine, which wasn't effective.     2. Crohns.  Doing okay.  She sees Dr. Page and is on azathioprine.     3. Hypovitaminosis D.  Continue supplement.     4. Bipolar.  She is feeling more depressed, which she attributes to stopping Abilify and chronic neck pain.  Under the care of Dr. Bolden, whom she will call.  Denies SI.     5. Menopausal symptom.  Controlled with Estrace.  Mammogram due; will wait until late July, 6 months after breast reduction surgery.     6. Insomnia.  Controlled with zolpidem per Dr. Bolden.     7. ADHD.  Adequately controlled on Adderall, per Dr. Bolden.     8. Osteopenia.  Dexa scan last done 11/2021.  She was taken off of calcium by GI. Continue vitamin D, weight-bearing exercise.  Recheck Dexa scan with mammogram in July.    9. Dystonia, chronic neck pain.  On clonazepam per Dr. Manuel.  She says ibuprofen is more effective than diclofenac.  She is interested in trying celecoxib.    10. Routine health maint.  UTD on vaccines.  Prevnar 20 in 2026.  Colonoscopy UTD 2020.  Doesn't need pap smear.  Mammogram due in July 2025 (6 months post breast surgery).    11. Elevated blood pressure reading.  Monitor home blood pressures and message me with numbers in 2-3 weeks.

## 2025-04-30 DIAGNOSIS — M54.12 CERVICAL RADICULAR PAIN: ICD-10-CM

## 2025-04-30 RX ORDER — CYCLOBENZAPRINE HCL 5 MG
5 TABLET ORAL 3 TIMES DAILY PRN
Qty: 90 TABLET | Refills: 0 | Status: SHIPPED | OUTPATIENT
Start: 2025-04-30

## 2025-04-30 NOTE — TELEPHONE ENCOUNTER
Rx Refill Note  Requested Prescriptions     Pending Prescriptions Disp Refills    cyclobenzaprine (FLEXERIL) 5 MG tablet [Pharmacy Med Name: CYCLOBENZAPRINE 5 MG TABLET] 90 tablet 0     Sig: TAKE 1 TABLET BY MOUTH THREE TIMES A DAY AS NEEDED FOR MUSCLE SPASM      Last office visit with prescribing clinician: 4/23/2025   Last telemedicine visit with prescribing clinician: Visit date not found   Next office visit with prescribing clinician: 4/27/2026                         Would you like a call back once the refill request has been completed: [] Yes [] No    If the office needs to give you a call back, can they leave a voicemail: [] Yes [] No    Adrianne Lira MA  04/30/25, 08:20 EDT

## 2025-05-16 NOTE — TELEPHONE ENCOUNTER
Up to date pap smear in Health Maintenance   Rx Refill Note  Requested Prescriptions     Pending Prescriptions Disp Refills    estradiol (ESTRACE) 2 MG tablet [Pharmacy Med Name: ESTRADIOL 2 MG TABLET] 90 tablet 3     Sig: TAKE 1 TABLET BY MOUTH EVERY DAY      Last office visit with prescribing clinician: 4/23/2025   Last telemedicine visit with prescribing clinician: Visit date not found   Next office visit with prescribing clinician: 4/27/2026                         Would you like a call back once the refill request has been completed: [] Yes [] No    If the office needs to give you a call back, can they leave a voicemail: [] Yes [] No    Adrianne Lira MA  05/16/25, 15:31 EDT

## 2025-05-19 RX ORDER — ESTRADIOL 2 MG/1
2 TABLET ORAL DAILY
Qty: 90 TABLET | Refills: 3 | Status: SHIPPED | OUTPATIENT
Start: 2025-05-19

## 2025-07-28 ENCOUNTER — HOSPITAL ENCOUNTER (OUTPATIENT)
Dept: BONE DENSITY | Facility: HOSPITAL | Age: 65
Discharge: HOME | End: 2025-07-28
Payer: MEDICARE

## 2025-07-28 ENCOUNTER — HOSPITAL ENCOUNTER (OUTPATIENT)
Dept: MAMMOGRAPHY | Facility: HOSPITAL | Age: 65
Discharge: HOME | End: 2025-07-28
Payer: MEDICARE

## 2025-07-28 DIAGNOSIS — M85.859 OSTEOPENIA OF HIP, UNSPECIFIED LATERALITY: ICD-10-CM

## 2025-07-28 DIAGNOSIS — Z12.31 ENCOUNTER FOR SCREENING MAMMOGRAM FOR MALIGNANT NEOPLASM OF BREAST: ICD-10-CM

## 2025-07-28 DIAGNOSIS — Z78.0 POSTMENOPAUSAL: ICD-10-CM

## 2025-07-28 PROCEDURE — 77080 DXA BONE DENSITY AXIAL: CPT

## 2025-07-28 PROCEDURE — 77067 SCR MAMMO BI INCL CAD: CPT

## 2025-07-28 PROCEDURE — 77063 BREAST TOMOSYNTHESIS BI: CPT

## 2025-07-28 NOTE — TELEPHONE ENCOUNTER
Urine Toxicology Performed in Last 12 Months    Previous Prescriber is not Historical    Recent Appt with me in Past 3 Months    Controlled Substance Agreement is on file    No Benzodiazepines on Active Med List    Medication not refilled in past 28 days     Pt is scheduled for April 2026    Rx Refill Note  Requested Prescriptions     Pending Prescriptions Disp Refills    clonazePAM (KlonoPIN) 1 MG tablet       Sig: Take 1 tablet by mouth 2 (Two) Times a Day As Needed.      Last office visit with prescribing clinician: 4/23/2025   Last telemedicine visit with prescribing clinician: Visit date not found   Next office visit with prescribing clinician: 4/27/2026                         Would you like a call back once the refill request has been completed: [] Yes [] No    If the office needs to give you a call back, can they leave a voicemail: [] Yes [] No    Adrianne Lira MA  07/28/25, 11:20 EDT

## 2025-07-30 RX ORDER — CLONAZEPAM 1 MG/1
1 TABLET ORAL 2 TIMES DAILY PRN
OUTPATIENT
Start: 2025-07-30

## 2025-08-06 DIAGNOSIS — M54.2 CHRONIC NECK PAIN: Primary | ICD-10-CM

## 2025-08-06 DIAGNOSIS — G89.29 CHRONIC NECK PAIN: Primary | ICD-10-CM

## 2025-08-06 RX ORDER — MELOXICAM 15 MG/1
15 TABLET ORAL DAILY
Qty: 30 TABLET | Refills: 5 | Status: SHIPPED | OUTPATIENT
Start: 2025-08-06

## 2025-08-26 ENCOUNTER — OFFICE VISIT (OUTPATIENT)
Dept: ORTHOPEDIC SURGERY | Facility: CLINIC | Age: 65
End: 2025-08-26
Payer: MEDICARE

## 2025-08-26 VITALS — WEIGHT: 135.6 LBS | HEIGHT: 63 IN | TEMPERATURE: 97.5 F | BODY MASS INDEX: 24.03 KG/M2

## 2025-08-26 DIAGNOSIS — Z96.641 STATUS POST TOTAL HIP REPLACEMENT, RIGHT: Primary | ICD-10-CM

## 2025-08-26 RX ORDER — TRIHEXYPHENIDYL HYDROCHLORIDE 2 MG/1
2 TABLET ORAL
COMMUNITY

## 2025-08-26 RX ORDER — VITAMIN A 3000 MCG
10000 CAPSULE ORAL DAILY
COMMUNITY